# Patient Record
Sex: FEMALE | Race: WHITE | NOT HISPANIC OR LATINO | Employment: FULL TIME | RURAL
[De-identification: names, ages, dates, MRNs, and addresses within clinical notes are randomized per-mention and may not be internally consistent; named-entity substitution may affect disease eponyms.]

---

## 2023-10-26 ENCOUNTER — OFFICE VISIT (OUTPATIENT)
Dept: FAMILY MEDICINE CLINIC | Facility: CLINIC | Age: 53
End: 2023-10-26
Payer: COMMERCIAL

## 2023-10-26 VITALS
TEMPERATURE: 98 F | DIASTOLIC BLOOD PRESSURE: 78 MMHG | HEART RATE: 86 BPM | HEIGHT: 62 IN | SYSTOLIC BLOOD PRESSURE: 116 MMHG | OXYGEN SATURATION: 99 % | RESPIRATION RATE: 18 BRPM | WEIGHT: 143 LBS | BODY MASS INDEX: 26.31 KG/M2

## 2023-10-26 DIAGNOSIS — Z76.89 ENCOUNTER TO ESTABLISH CARE: ICD-10-CM

## 2023-10-26 DIAGNOSIS — K04.7 DENTAL INFECTION: ICD-10-CM

## 2023-10-26 DIAGNOSIS — Z72.0 TOBACCO USE: ICD-10-CM

## 2023-10-26 DIAGNOSIS — L03.90 CELLULITIS, UNSPECIFIED CELLULITIS SITE: ICD-10-CM

## 2023-10-26 DIAGNOSIS — Z00.00 WELLNESS EXAMINATION: ICD-10-CM

## 2023-10-26 DIAGNOSIS — Z11.59 NEED FOR HEPATITIS C SCREENING TEST: ICD-10-CM

## 2023-10-26 DIAGNOSIS — E55.9 VITAMIN D DEFICIENCY: ICD-10-CM

## 2023-10-26 DIAGNOSIS — Z78.0 MENOPAUSE: ICD-10-CM

## 2023-10-26 DIAGNOSIS — J44.9 CHRONIC OBSTRUCTIVE PULMONARY DISEASE, UNSPECIFIED COPD TYPE: ICD-10-CM

## 2023-10-26 DIAGNOSIS — R91.1 LUNG NODULE: ICD-10-CM

## 2023-10-26 NOTE — PATIENT INSTRUCTIONS
"There are several vaccines used for individuals near/over 65 years old.      1. Tetanus.   Like anyone this needs to given every 10 years; sooner for/with lacerations/wounds.   Likely when getting this booster it needs to be a tetanus called Tdap (tetanus mixed with diptheria and pertussis).   Years ago you had this vaccine.  We now know we can lose our immunity to pertussis (a part of this vaccine) and run a risk of catching this.  Now only would this make us ill; but more importantly we can spread this to very young children (and for them it can be a much more dangerous illness).   We call this the grandparent vaccine for this reason.     2. Pneumonia.   This comes now as a one time vaccine for most persons.  Even if you have had these before; we need to review when and your current health situation/s as you may need a booster     3. Shingles.  You do not want to catch shingles.  Though you will recover from this; the pain associated with shingles can be severe.  Even if you have had the now older zostavax, or have had shingles; it is recommended you still get the Shingrix (the new just available early 2018 shingles vaccine).   Medicare began 1.1.23 waving any co-pays for this; it is therefore free.     4. Flu/influenza: Yearly flu vaccine given from September through April each year.  For those over 65 it should be \"high dose\" flu (to complement the possibility the immune system is alittle weaker)    5. RSV: If you are over 60; beginning 10.1.23 you can now take a RSV vaccination.  This would lower your change for catching this winter/\"cold\" virus that can under some circumstances cause significant respiratory symptoms and even require hospitalization.  Being vaccinated also makes it more difficult for you to be contagious and get this to children; particularly children less than 6 months of where this can be a very dangerous illness.      6. COVID: Since the early onset of COVID illness and a the many COVID " vaccines that were initially developed; we will have a winter 2023 (and maybe beyond)  booster particularly for those at higher risk of complications and over age 65.    7. Travel vaccines:  If you are one to do international travel; be sure and ask us for any particular unusual vaccines you may need.     8.  Miscellaneous:  If you have certain health situations/disease you may need specific/particular vaccines not give to the general public.     Your records we have on file:     There is no immunization history on file for this patient.    You therefore could need the ones if we listed below:   Winter 2023 shots: latest/updated covid vaccine + 2023 flu/high dose flu (can/should be given same day)                                    After this; take a break and then work on getting those other vaccines still needed:   shingles      Because of many restrictions on this office always having all the above vaccines; you may be advised to work with your local health department or various pharmacies to keep up with your individual vaccine needs.  If you are forced to get a vaccine outside this office in order to keep your health record up to date; we request you personally notify us after any vaccines of the type and date.

## 2023-10-26 NOTE — PROGRESS NOTES
KIARA”.   Subjective   Erica Rosas is a 53 y.o. female presenting with chief complaint of:   Chief Complaint   Patient presents with    Follow-up     AWV NA  Last Completed Annual Wellness Visit       This patient has no relevant Health Maintenance data.             History of Present Illness :  Alone.  Here for primarily establish care and several issues.       Has multiple chronic problems to consider that might have a bearing on today's issues; an initial appointment.       Chronic/acute problems reviewed today:   1. Lung nodule: told on [facility] CT chest several nodules.  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    2. Cellulitis, unspecified cellulitis site: history/nothing active today   3. Wellness examination: reviewed PH   4. Dental infection several teeth broke off; hx facial cellulits x 2.  Dentist apt today   5. Chronic obstructive pulmonary disease, unspecified COPD type: told she has; daily some cough without hemoptysis, sob, wheeze   6. Tobacco use: smoker/2nd exposure since age 14/birth; still smokes   7. Menopause: chronic/menses has stopped without hot flashes; no recent gyne   8. Encounter to establish care: initial visit; nothing found TCC   9. Vitamin D deficiency: at risk for low Vit D/osteoporosis   10. Need for hepatitis C screening test : unaware if ever tested; has tatoos but no recall being told every had liver problem     Has an/another acute issue today: .    The following portions of the patient's history were reviewed and updated as appropriate: allergies, current medications, past family history, past medical history, past social history, past surgical history, and problem list.    No current outpatient medications on file.none    No problems with medications in general    No Known Allergies    Review of Systems  GENERAL:  Active/slower with limits, speed, stamina for age. Sleep is ok. No fever now./recent  SKIN: No  rash/skin lesion of concern  "unless/other than that above if mentioned  ENDO:  No syncope, near or diaphoretic sweaty spells.  BS Ok past..  HEENT: No recent head injury; or headache.  No vision change.  No hearing loss.  Ears without pain/drainage.  No sore throat.  No  significant nasal/sinus congestion/drainage. No epistaxis.  CHEST: No chest wall tenderness or mass. No significant cough, (though occ) without wheeze.  No SOB; no hemoptysis.  CV: No exertional chest pain, palpitations, ankle edema.  GI: No dysphagia or heartburn.  No abdominal pain, diarrhea, constipation.  No rectal bleeding, or melena.    :  Voids without dysuria;  incontinence to completion.  ORTHO: No painful/swollen joints but various on /off sore.  No  sore neck or back.  No acute neck or back pain without recent injury.  NEURO: No focal/significant weakness of extremities. No dizziness.   No numbness/paresthesias.   PSYCH: No memory loss.  Mood good/occ anxious, depressed but/and not suicidal.  Tries to tolerate stress .   Screening:  Gyne: years ago  Mammogram:   Last Completed Mammogram       This patient has no relevant Health Maintenance data.           Bone density: never  Low dose CT chest: ?  GI: none; advised later  Prostate: NA  Usual lab order to establish    Copy/paste function used for ROS/exam AND each area of these were reviewed, updated, confirmed and supplemented as needed.  Data reviewed:   Recent admit/ER/MD visits: requesting care everwher  Last cardiac testing:   Echo: none found    Radiology considered:   No radiology results for the last 90 days.    Lab Results:  No results found for this or any previous visit.    Objective   /78   Pulse 86   Temp 98 °F (36.7 °C) (Temporal)   Resp 18   Ht 156.2 cm (61.5\")   Wt 64.9 kg (143 lb)   SpO2 99%   BMI 26.58 kg/m²   Body mass index is 26.58 kg/m².    Recent Vitals         10/26/2023             BP: 116/78    Pulse: 86    Temp: 98 °F (36.7 °C)    Weight: 64.9 kg (143 lb)    BMI (Calculated): " 26.6          Wt Readings from Last 15 Encounters:   10/26/23 0818 64.9 kg (143 lb)       Physical Exam  GENERAL: AFA/developed in no acute distress.   SKIN: Turgor ok without wound, rash, lesion of significance  HEENT: Normal cephalic without trauma.  Pupils equal round reactive to light. Extraocular motions full without nystagmus.   External canals nonobstructive nontender without reddness. Tymphatic membranes tia with joshua structures intact.   Oral cavity without growths, exudates, and moist.  Posterior pharynx without mass, obstruction, redness.  No thyromegaly, mass, tenderness, lymphadenopathy and supple.  CV: Regular rhythm.  No murmur, gallop,  edema. Posterior pulses intact.  No carotid bruits.  CHEST: No chest wall tenderness or mass.   LUNGS: Symmetric motion with clear to auscultation.   ABD: Soft, nontender without mass.   ORTHO: Symmetric extremities without swelling/point tenderness.  Full gross range of motion..  NEURO: CN 2-12 grossly intact.  Symmetric facies and UE/LE. 3/5 strength throughout. 1/4 x bicep knee equal reflexes.  Nonfocal use extremities. Speech clear.  Intact light touch with monofilament, vibratory sensation with tuning fork; equal toes/distal feet.    PSYCH: Oriented x 3.  Pleasant calm, well kept.  Purposeful/directed conservation with intact short/long gross memory.     Assessment & Plan     1. Lung nodule    2. Cellulitis, unspecified cellulitis site    3. Wellness examination    4. Dental infection    5. Chronic obstructive pulmonary disease, unspecified COPD type    6. Tobacco use    7. Menopause    8. Encounter to establish care    9. Vitamin D deficiency    10. Need for hepatitis C screening test        Data review above:   Discussions/medical decisions/reviews:  BP ok  Other vitals ok  Recent Vitals         10/26/2023             BP: 116/78    Pulse: 86    Temp: 98 °F (36.7 °C)    Weight: 64.9 kg (143 lb)    BMI (Calculated): 26.6          Initial visit done  Screening  "reviewed/updated to work on  Vaccines discussed (see below)   Several significiant problems; they do need evals  Get records/set up for review; ie care everywhere to review  Labs today; baseline review    Follow up: Return for help her set up care everywhere-emphasis [facility]/Valery MASON; lab today then Dr Reed 1m.  Future Appointments   Date Time Provider Department Center   11/28/2023  8:30 AM Kostas Reed MD [facility]       Data review above:   Rx: reviewed and decisions:   Rx new/changes: none  No orders of the defined types were placed in this encounter.      Orders placed:   LAB/Testing/Referrals: reviewed/orders:   Today:   Orders Placed This Encounter   Procedures    Comprehensive metabolic panel    TSH    Vitamin D,25-Hydroxy    Hepatitis C Antibody    CBC and Differential    Urinalysis With Microscopic - Urine, Clean Catch     Chronic/recurrent labs above or change to:   Same       There is no immunization history on file for this patient.  We advised/reaffirmed our support/suggestion for staying complete with covid- covid boosters, seasonal flu/yearly and any missing vaccine from list we supplied; when cannot be given here we suggest contact with local health department office or pharmacy to review missing/needed vaccines and then bring nursing documentation for these vaccines to this office or call this information in. Shingles became \"free\" 1.1.23 for medicare insurance.    Health maintenance:   Body mass index is 26.58 kg/m².  BMI is >= 25 and <30. (Overweight) The following options were offered after discussion;: exercise counseling/recommendations and nutrition counseling/recommendations      Tobacco use reviewed:   Erica Rosas  reports that she has been smoking cigarettes. She started smoking about 38 years ago. She has a 38.00 pack-year smoking history. She has never used smokeless tobacco.. I have educated her on the risk of diseases from using tobacco products such as cancer, " "COPD, and heart disease.     I advised her to quit and she is not willing to quit.    I spent 3  minutes counseling the patient.      Patient Instructions   There are several vaccines used for individuals near/over 65 years old.      1. Tetanus.   Like anyone this needs to given every 10 years; sooner for/with lacerations/wounds.   Likely when getting this booster it needs to be a tetanus called Tdap (tetanus mixed with diptheria and pertussis).   Years ago you had this vaccine.  We now know we can lose our immunity to pertussis (a part of this vaccine) and run a risk of catching this.  Now only would this make us ill; but more importantly we can spread this to very young children (and for them it can be a much more dangerous illness).   We call this the grandparent vaccine for this reason.     2. Pneumonia.   This comes now as a one time vaccine for most persons.  Even if you have had these before; we need to review when and your current health situation/s as you may need a booster     3. Shingles.  You do not want to catch shingles.  Though you will recover from this; the pain associated with shingles can be severe.  Even if you have had the now older zostavax, or have had shingles; it is recommended you still get the Shingrix (the new just available early 2018 shingles vaccine).   Medicare began 1.1.23 waving any co-pays for this; it is therefore free.     4. Flu/influenza: Yearly flu vaccine given from September through April each year.  For those over 65 it should be \"high dose\" flu (to complement the possibility the immune system is alittle weaker)    5. RSV: If you are over 60; beginning 10.1.23 you can now take a RSV vaccination.  This would lower your change for catching this winter/\"cold\" virus that can under some circumstances cause significant respiratory symptoms and even require hospitalization.  Being vaccinated also makes it more difficult for you to be contagious and get this to children; " particularly children less than 6 months of where this can be a very dangerous illness.      6. COVID: Since the early onset of COVID illness and a the many COVID vaccines that were initially developed; we will have a winter 2023 (and maybe beyond)  booster particularly for those at higher risk of complications and over age 65.    7. Travel vaccines:  If you are one to do international travel; be sure and ask us for any particular unusual vaccines you may need.     8.  Miscellaneous:  If you have certain health situations/disease you may need specific/particular vaccines not give to the general public.     Your records we have on file:     There is no immunization history on file for this patient.    You therefore could need the ones if we listed below:   Winter 2023 shots: latest/updated covid vaccine + 2023 flu/high dose flu (can/should be given same day)                                    After this; take a break and then work on getting those other vaccines still needed:   shingles      Because of many restrictions on this office always having all the above vaccines; you may be advised to work with your local health department or various pharmacies to keep up with your individual vaccine needs.  If you are forced to get a vaccine outside this office in order to keep your health record up to date; we request you personally notify us after any vaccines of the type and date.

## 2023-10-27 ENCOUNTER — PATIENT ROUNDING (BHMG ONLY) (OUTPATIENT)
Dept: FAMILY MEDICINE CLINIC | Facility: CLINIC | Age: 53
End: 2023-10-27
Payer: COMMERCIAL

## 2023-10-27 LAB
25(OH)D3+25(OH)D2 SERPL-MCNC: 12.9 NG/ML (ref 30–100)
ALBUMIN SERPL-MCNC: 4.8 G/DL (ref 3.8–4.9)
ALBUMIN/GLOB SERPL: 2.3 {RATIO} (ref 1.2–2.2)
ALP SERPL-CCNC: 87 IU/L (ref 44–121)
ALT SERPL-CCNC: 12 IU/L (ref 0–32)
APPEARANCE UR: CLEAR
AST SERPL-CCNC: 20 IU/L (ref 0–40)
BACTERIA #/AREA URNS HPF: NORMAL /[HPF]
BASOPHILS # BLD AUTO: 0 X10E3/UL (ref 0–0.2)
BASOPHILS NFR BLD AUTO: 1 %
BILIRUB SERPL-MCNC: 0.3 MG/DL (ref 0–1.2)
BILIRUB UR QL STRIP: NEGATIVE
BUN SERPL-MCNC: 8 MG/DL (ref 6–24)
BUN/CREAT SERPL: 11 (ref 9–23)
CALCIUM SERPL-MCNC: 9.3 MG/DL (ref 8.7–10.2)
CASTS URNS QL MICRO: NORMAL /LPF
CHLORIDE SERPL-SCNC: 100 MMOL/L (ref 96–106)
CO2 SERPL-SCNC: 27 MMOL/L (ref 20–29)
COLOR UR: YELLOW
CREAT SERPL-MCNC: 0.76 MG/DL (ref 0.57–1)
EGFRCR SERPLBLD CKD-EPI 2021: 95 ML/MIN/1.73
EOSINOPHIL # BLD AUTO: 0.2 X10E3/UL (ref 0–0.4)
EOSINOPHIL NFR BLD AUTO: 4 %
EPI CELLS #/AREA URNS HPF: NORMAL /HPF (ref 0–10)
ERYTHROCYTE [DISTWIDTH] IN BLOOD BY AUTOMATED COUNT: 12.2 % (ref 11.7–15.4)
GLOBULIN SER CALC-MCNC: 2.1 G/DL (ref 1.5–4.5)
GLUCOSE SERPL-MCNC: 68 MG/DL (ref 70–99)
GLUCOSE UR QL STRIP: NEGATIVE
HCT VFR BLD AUTO: 42.4 % (ref 34–46.6)
HCV IGG SERPL QL IA: NON REACTIVE
HGB BLD-MCNC: 13.8 G/DL (ref 11.1–15.9)
HGB UR QL STRIP: NEGATIVE
IMM GRANULOCYTES # BLD AUTO: 0 X10E3/UL (ref 0–0.1)
IMM GRANULOCYTES NFR BLD AUTO: 0 %
KETONES UR QL STRIP: NEGATIVE
LEUKOCYTE ESTERASE UR QL STRIP: NEGATIVE
LYMPHOCYTES # BLD AUTO: 2.3 X10E3/UL (ref 0.7–3.1)
LYMPHOCYTES NFR BLD AUTO: 41 %
MCH RBC QN AUTO: 28.7 PG (ref 26.6–33)
MCHC RBC AUTO-ENTMCNC: 32.5 G/DL (ref 31.5–35.7)
MCV RBC AUTO: 88 FL (ref 79–97)
MICRO URNS: NORMAL
MICRO URNS: NORMAL
MONOCYTES # BLD AUTO: 0.5 X10E3/UL (ref 0.1–0.9)
MONOCYTES NFR BLD AUTO: 8 %
NEUTROPHILS # BLD AUTO: 2.6 X10E3/UL (ref 1.4–7)
NEUTROPHILS NFR BLD AUTO: 46 %
NITRITE UR QL STRIP: NEGATIVE
PH UR STRIP: 7 [PH] (ref 5–7.5)
PLATELET # BLD AUTO: 356 X10E3/UL (ref 150–450)
POTASSIUM SERPL-SCNC: 4.4 MMOL/L (ref 3.5–5.2)
PROT SERPL-MCNC: 6.9 G/DL (ref 6–8.5)
PROT UR QL STRIP: NEGATIVE
RBC # BLD AUTO: 4.81 X10E6/UL (ref 3.77–5.28)
RBC #/AREA URNS HPF: NORMAL /HPF (ref 0–2)
SODIUM SERPL-SCNC: 139 MMOL/L (ref 134–144)
SP GR UR STRIP: 1.02 (ref 1–1.03)
TSH SERPL DL<=0.005 MIU/L-ACNC: 0.64 UIU/ML (ref 0.45–4.5)
UROBILINOGEN UR STRIP-MCNC: 0.2 MG/DL (ref 0.2–1)
WBC # BLD AUTO: 5.7 X10E3/UL (ref 3.4–10.8)
WBC #/AREA URNS HPF: NORMAL /HPF (ref 0–5)

## 2023-10-27 RX ORDER — ERGOCALCIFEROL 1.25 MG/1
50000 CAPSULE ORAL WEEKLY
Qty: 6 CAPSULE | Refills: 0 | Status: SHIPPED | OUTPATIENT
Start: 2023-10-27

## 2023-10-27 NOTE — PROGRESS NOTES
Reviewed results - BIOSAFEt message sent.  If not seen in 3 days (3 day alert set), will send to pool to call the message.      Electronically signed by JORDAN Malloy, 10/27/23, 8:11 AM CDT.

## 2023-10-27 NOTE — PROGRESS NOTES
October 27, 2023    Hello, may I speak with Erica Rosas?    My name is haydee      I am  with [facility]  [address] [facility] [id number] [phone].    Before we get started may I verify your date of birth? 1970    I am calling to officially welcome you to our practice and ask about your recent visit. Is this a good time to talk? yes    Tell me about your visit with us. What things went well?  very good       We're always looking for ways to make our patients' experiences even better. Do you have recommendations on ways we may improve?  no    Overall were you satisfied with your first visit to our practice? yes       I appreciate you taking the time to speak with me today. Is there anything else I can do for you? no      Thank you, and have a great day.

## 2023-11-28 ENCOUNTER — OFFICE VISIT (OUTPATIENT)
Dept: FAMILY MEDICINE CLINIC | Facility: CLINIC | Age: 53
End: 2023-11-28
Payer: COMMERCIAL

## 2023-11-28 VITALS
DIASTOLIC BLOOD PRESSURE: 72 MMHG | BODY MASS INDEX: 26.31 KG/M2 | SYSTOLIC BLOOD PRESSURE: 124 MMHG | HEIGHT: 62 IN | OXYGEN SATURATION: 99 % | WEIGHT: 143 LBS | RESPIRATION RATE: 18 BRPM | TEMPERATURE: 96.9 F | HEART RATE: 66 BPM

## 2023-11-28 DIAGNOSIS — E55.9 VITAMIN D DEFICIENCY: ICD-10-CM

## 2023-11-28 DIAGNOSIS — J44.9 CHRONIC OBSTRUCTIVE PULMONARY DISEASE, UNSPECIFIED COPD TYPE: ICD-10-CM

## 2023-11-28 DIAGNOSIS — Z13.9 SCREENING DUE: ICD-10-CM

## 2023-11-28 DIAGNOSIS — R91.1 LUNG NODULE: ICD-10-CM

## 2023-11-28 DIAGNOSIS — Z78.0 MENOPAUSE: ICD-10-CM

## 2023-11-28 DIAGNOSIS — Z12.31 ENCOUNTER FOR SCREENING MAMMOGRAM FOR BREAST CANCER: ICD-10-CM

## 2023-11-28 DIAGNOSIS — Z72.0 TOBACCO USE: ICD-10-CM

## 2023-11-28 DIAGNOSIS — Z12.11 ENCOUNTER FOR SCREENING FOR MALIGNANT NEOPLASM OF COLON: ICD-10-CM

## 2023-11-28 RX ORDER — ERGOCALCIFEROL 1.25 MG/1
50000 CAPSULE ORAL WEEKLY
Qty: 6 CAPSULE | Refills: 0 | Status: SHIPPED | OUTPATIENT
Start: 2023-11-28

## 2023-11-28 RX ORDER — FAMOTIDINE 20 MG
1000 TABLET ORAL 2 TIMES DAILY
Qty: 60 CAPSULE | Refills: 5 | Status: SHIPPED | OUTPATIENT
Start: 2023-11-28

## 2023-11-28 NOTE — PROGRESS NOTES
KIARA”.   Subjective   Erica Rosas is a 53 y.o. female presenting with chief complaint of:   Chief Complaint   Patient presents with    Follow-up     AWV NA  Last Completed Annual Wellness Visit       This patient has no relevant Health Maintenance data.             History of Present Illness :  Alone.  Here for primarily f/u last visit; initial encounter visit.   Here for review of chronic problems that includes vit D def and others    Has few chronic problems to consider that might have a bearing on today's issues;  an interval appointment.       Chronic/acute problems reviewed today:   1. Encounter for screening for malignant neoplasm of colon: has never had colonoscopy; denies change stool, bleeding.     2. Vitamin D deficiency: see labs   3. Menopause: chronic/stable; tolerated with low Vit D   4. Lung nodule: told at [facility] ER on CXR; needs some kind of f/u.  We requested records last visit and have not received; care everwhere nothing.  No hemoptysis; long term smoker.    5. Chronic obstructive pulmonary disease, unspecified COPD type: Chronic/stable no significant cough, sob, wheeze.  Rx declined or testing.   Still smoking.       6. Tobacco use: Chronic/stable. Has been advised before/here to stop smoking and giving advice of available resources to help with that.  Has never really stopped before.      7. Screening due: due several screening; has had limitations on abilities to do these prior to current insurance.     8. Encounter for screening mammogram for breast cancer: Chronic/ongoing yearly need to review for breast cancer.  No breast pain, masses, discharge.         Has an/another acute issue today: none.    The following portions of the patient's history were reviewed and updated as appropriate: allergies, current medications, past family history, past medical history, past social history, past surgical history, and problem list.    Current Outpatient Medications:     vitamin D  "(ERGOCALCIFEROL) 1.25 MG (19418 UT) capsule capsule, TAKE 1 CAPSULE BY MOUTH 1 (ONE) TIME PER WEEK., Disp: 6 capsule, Rfl: 0    No problems with medications.    No Known Allergies    Review of Systems  GENERAL:  Active/slower with limits, speed, stamina for age. Sleep is ok. No fever now./recent  SKIN: No  rash/skin lesion of concern unless/other than that above if mentioned  ENDO:  No syncope, near or diaphoretic sweaty spells.  BS Ok past..  HEENT: No recent head injury; or headache.  No vision change.  No hearing loss.  Ears without pain/drainage.  No sore throat.  No significant nasal/sinus congestion/drainage. No epistaxis.  CHEST: No chest wall tenderness or mass. No significant cough, (though occ) without wheeze.  No SOB; no hemoptysis.  CV: No exertional chest pain, palpitations, ankle edema.  GI: No dysphagia or heartburn.  No abdominal pain, diarrhea, constipation.  No rectal bleeding, or melena.    :  Voids without dysuria; no incontinence to completion.  ORTHO: No painful/swollen joints but various on /off sore.  No  sore neck or back.  No acute neck or back pain without recent injury.  NEURO: No focal/significant weakness of extremities. No dizziness.   No numbness/paresthesias.   PSYCH: No memory loss.  Mood good/occ anxious, depressed but/and not suicidal.  Tries to tolerate stress .   Screening:  Gyne: years ago  Mammogram: none  Bone density: never  Low dose CT chest: Tobacco-smoker/age 15/1 ppd  Ordering 12.2023 Ct lung due to CXR [facility] \"nodule\"  GI: none; advised later  Prostate: NA  Usual lab order to establish     Copy/paste function used for ROS/exam AND each area of these were reviewed, updated, confirmed and supplemented as needed.  Data reviewed:   Recent admit/ER/MD visits: 10.26.23    1. Lung nodule    2. Cellulitis, unspecified cellulitis site    3. Wellness examination    4. Dental infection    5. Chronic obstructive pulmonary disease, unspecified COPD type    6. Tobacco use "    7. Menopause    8. Encounter to establish care    9. Vitamin D deficiency    10. Need for hepatitis C screening test          Data review above:   Discussions/medical decisions/reviews:  BP ok  Other vitals ok  Recent Vitals           10/26/2023                    BP: 116/78     Pulse: 86     Temp: 98 °F (36.7 °C)     Weight: 64.9 kg (143 lb)     BMI (Calculated): 26.6             Initial visit done  Screening reviewed/updated to work on  Vaccines discussed (see below)   Several significiant problems; they do need evals  Get records/set up for review; ie care everywhere to review  Labs today; baseline review     Follow up: Return for help her set up care everywhere-emphasis [facility]; lab today then Dr Reed 1m.         Future Appointments   Date Time Provider Department Center   11/28/2023  8:30 AM Kostas Reed MD [facility]         Data review above:   Rx: reviewed and decisions:   Rx new/changes: none  No orders of the defined types were placed in this encounter.        Orders placed:   LAB/Testing/Referrals: reviewed/orders:   Today:       Orders Placed This Encounter   Procedures    Comprehensive metabolic panel    TSH    Vitamin D,25-Hydroxy    Hepatitis C Antibody    CBC and Differential    Urinalysis With Microscopic - Urine, Clean Catch       Last cardiac testing:   Echo:     Radiology considered:   No radiology results for the last 90 days.    Lab Results:  Results for orders placed or performed in visit on 10/26/23   Comprehensive metabolic panel    Specimen: Blood   Result Value Ref Range    Glucose 68 (L) 70 - 99 mg/dL    BUN 8 6 - 24 mg/dL    Creatinine 0.76 0.57 - 1.00 mg/dL    EGFR Result 95 >59 mL/min/1.73    BUN/Creatinine Ratio 11 9 - 23    Sodium 139 134 - 144 mmol/L    Potassium 4.4 3.5 - 5.2 mmol/L    Chloride 100 96 - 106 mmol/L    Total CO2 27 20 - 29 mmol/L    Calcium 9.3 8.7 - 10.2 mg/dL    Total Protein 6.9 6.0 - 8.5 g/dL    Albumin 4.8 3.8 - 4.9 g/dL     Globulin 2.1 1.5 - 4.5 g/dL    A/G Ratio 2.3 (H) 1.2 - 2.2    Total Bilirubin 0.3 0.0 - 1.2 mg/dL    Alkaline Phosphatase 87 44 - 121 IU/L    AST (SGOT) 20 0 - 40 IU/L    ALT (SGPT) 12 0 - 32 IU/L   TSH    Specimen: Blood   Result Value Ref Range    TSH 0.642 0.450 - 4.500 uIU/mL   Vitamin D,25-Hydroxy    Specimen: Blood   Result Value Ref Range    25 Hydroxy, Vitamin D 12.9 (L) 30.0 - 100.0 ng/mL   Microscopic Examination -   Result Value Ref Range    WBC, UA None seen 0 - 5 /hpf    RBC, UA 0-2 0 - 2 /hpf    Epithelial Cells (non renal) 0-10 0 - 10 /hpf    Casts None seen None seen /lpf    Bacteria, UA None seen None seen/Few   Hepatitis C Antibody   Result Value Ref Range    Hep C Virus Ab Non Reactive Non Reactive   CBC and Differential    Specimen: Blood   Result Value Ref Range    WBC 5.7 3.4 - 10.8 x10E3/uL    RBC 4.81 3.77 - 5.28 x10E6/uL    Hemoglobin 13.8 11.1 - 15.9 g/dL    Hematocrit 42.4 34.0 - 46.6 %    MCV 88 79 - 97 fL    MCH 28.7 26.6 - 33.0 pg    MCHC 32.5 31.5 - 35.7 g/dL    RDW 12.2 11.7 - 15.4 %    Platelets 356 150 - 450 x10E3/uL    Neutrophil Rel % 46 Not Estab. %    Lymphocyte Rel % 41 Not Estab. %    Monocyte Rel % 8 Not Estab. %    Eosinophil Rel % 4 Not Estab. %    Basophil Rel % 1 Not Estab. %    Neutrophils Absolute 2.6 1.4 - 7.0 x10E3/uL    Lymphocytes Absolute 2.3 0.7 - 3.1 x10E3/uL    Monocytes Absolute 0.5 0.1 - 0.9 x10E3/uL    Eosinophils Absolute 0.2 0.0 - 0.4 x10E3/uL    Basophils Absolute 0.0 0.0 - 0.2 x10E3/uL    Immature Granulocyte Rel % 0 Not Estab. %    Immature Grans Absolute 0.0 0.0 - 0.1 x10E3/uL   Urinalysis With Microscopic - Urine, Clean Catch    Specimen: Urine, Clean Catch   Result Value Ref Range    Specific Gravity, UA 1.018 1.005 - 1.030    pH, UA 7.0 5.0 - 7.5    Color, UA Yellow Yellow    Appearance, UA Clear Clear    Leukocytes, UA Negative Negative    Protein Negative Negative/Trace    Glucose, UA Negative Negative    Ketones Negative Negative    Blood, UA  "Negative Negative    Bilirubin, UA Negative Negative    Urobilinogen, UA 0.2 0.2 - 1.0 mg/dL    Nitrite, UA Negative Negative    Microscopic Examination Comment     Microscopic Examination See below:        A1C:No results for input(s): \"HGBA1C\" in the last 77875 hours.  GLUCOSE:  Lab Results - Last 18 Months   Lab Units 10/26/23  0758   GLUCOSE mg/dL 68*     LIPID:No results for input(s): \"CHLPL\", \"LDL\", \"HDL\", \"TRIG\" in the last 18985 hours.  PSA:No results found for: \"PSA\"      CBC:  Lab Results - Last 18 Months   Lab Units 10/26/23  0758   WBC x10E3/uL 5.7   HEMOGLOBIN g/dL 13.8   HEMATOCRIT % 42.4   PLATELETS x10E3/uL 356     BMP/CMP:  Lab Results - Last 18 Months   Lab Units 10/26/23  0758   SODIUM mmol/L 139   POTASSIUM mmol/L 4.4   CHLORIDE mmol/L 100   CO2 mmol/L 27   GLUCOSE mg/dL 68*   BUN mg/dL 8   CREATININE mg/dL 0.76   EGFR RESULT mL/min/1.73 95   CALCIUM mg/dL 9.3       HEPATIC:  Lab Results - Last 18 Months   Lab Units 10/26/23  0758   ALT (SGPT) IU/L 12   AST (SGOT) IU/L 20   ALK PHOS IU/L 87     HEPATITIS C ANTIBODY:   Lab Results   Component Value Date/Time    HEPCVIRUSABY Non Reactive 10/26/2023 07:58 AM     Vit D:  Lab Results - Last 18 Months   Lab Units 10/26/23  0758   VIT D 25 HYDROXY ng/mL 12.9*     THYROID:  Lab Results - Last 18 Months   Lab Units 10/26/23  0758   TSH uIU/mL 0.642       Objective   /72   Pulse 66   Temp 96.9 °F (36.1 °C) (Temporal)   Resp 18   Ht 156.2 cm (61.5\")   Wt 64.9 kg (143 lb)   SpO2 99%   BMI 26.58 kg/m²   Body mass index is 26.58 kg/m².    Recent Vitals         10/26/2023             BP: 116/78    Pulse: 86    Temp: 98 °F (36.7 °C)    Weight: 64.9 kg (143 lb)    BMI (Calculated): 26.6          Wt Readings from Last 15 Encounters:   11/28/23 0846 64.9 kg (143 lb)   10/26/23 0818 64.9 kg (143 lb)       Physical Exam  GENERAL: AFA/developed in no acute distress.   SKIN: Turgor ok without wound, rash, lesion of significance  HEENT: Normal cephalic " without trauma.  Pupils equal round reactive to light. Extraocular motions full without nystagmus.   External canals nonobstructive nontender without reddness. Tymphatic membranes tia with joshua structures intact.   Oral cavity without growths, exudates, and moist.  Posterior pharynx without mass, obstruction, redness.  No thyromegaly, mass, tenderness, lymphadenopathy and supple.  CV: Regular rhythm.  No murmur, gallop,  edema. Posterior pulses intact.  No carotid bruits.  CHEST: No chest wall tenderness or mass.   LUNGS: Symmetric motion with clear to auscultation.   ABD: Soft, nontender without mass.   ORTHO: Symmetric extremities without swelling/point tenderness.  Full gross range of motion..  NEURO: CN 2-12 grossly intact.  Symmetric facies and UE/LE. 3/5 strength throughout. 1/4 x bicep knee equal reflexes.  Nonfocal use extremities. Speech clear.  Intact light touch with monofilament, vibratory sensation with tuning fork; equal toes/distal feet.    PSYCH: Oriented x 3.  Pleasant calm, well kept.  Purposeful/directed conservation with intact short/long gross memory.     Assessment & Plan     1. Encounter for screening for malignant neoplasm of colon    2. Vitamin D deficiency    3. Menopause    4. Lung nodule    5. Chronic obstructive pulmonary disease, unspecified COPD type    6. Tobacco use    7. Screening due    8. Encounter for screening mammogram for breast cancer        Data review above:   Discussions/medical decisions/reviews:  BP ok  Other vitals ok  Recent Vitals         10/26/2023             BP: 116/78    Pulse: 86    Temp: 98 °F (36.7 °C)    Weight: 64.9 kg (143 lb)    BMI (Calculated): 26.6          DM/BS 68 10.26.23  Lipid LDL sometime  PSA NA  CBC ok 10.26.23  Renal ok 10.26.23  Liver ok 10.26.23  Vit D 12.9 10.26.23  Thyroid TSH ok 10.26.23    Screening reviewed/updated attempted to work on (mammogram, gi/colon)  Vaccines discussed (see below) winter flu, covid, declined  Needs CT lung at  "some point; freda chose 3m from she being told but if she wants to go to [facility] and get records and bring here; will review  [facility] for getting oral surgery/dental work done discussed.  She was told only site [place].   Reviewed with her [facility] [address] as maybe method to get there and get care.     Follow up: Return for lab/Dr Reed 6m.  Future Appointments   Date Time Provider Department Center   1/17/2024  8:10 AM [facility]   5/28/2024  9:45 AM Kostas Reed MD [facility]       Data review above:   Rx: reviewed and decisions:   Rx new/changes: new  New Medications Ordered This Visit   Medications    Vitamin D, Cholecalciferol, 25 MCG (1000 UT) capsule     Sig: Take 1 capsule by mouth 2 (Two) Times a Day.     Dispense:  60 capsule     Refill:  5       Orders placed:   LAB/Testing/Referrals: reviewed/orders:   Today:   Orders Placed This Encounter   Procedures    CT Chest With & Without Contrast    Mammo Screening Digital Tomosynthesis Bilateral With CAD    Ambulatory Referral to Gastroenterology     Chronic/recurrent labs above or change to:   Same       There is no immunization history on file for this patient.  We advised/reaffirmed our support/suggestion for staying complete with covid- covid boosters, seasonal flu/yearly and any missing vaccine from list we supplied; when cannot be given here we suggest contact with local health department office or pharmacy to review missing/needed vaccines and then bring nursing documentation for these vaccines to this office or call this information in. Shingles became \"free\" 1.1.23 for medicare insurance.    Health maintenance:   Body mass index is 26.58 kg/m².         Tobacco use reviewed:   Erica Rosas  reports that she has been smoking cigarettes. She started smoking about 38 years ago. She has a 38.00 pack-year smoking history. She has never used smokeless tobacco.. I have educated her on the risk " of diseases from using tobacco products such as cancer, COPD, and heart disease.     I advised her to quit and she is not willing to quit.    I spent 3  minutes counseling the patient.       Reminded; as discussed before.      There are no Patient Instructions on file for this visit.

## 2023-12-05 DIAGNOSIS — Z12.31 ENCOUNTER FOR SCREENING MAMMOGRAM FOR BREAST CANCER: ICD-10-CM

## 2023-12-05 DIAGNOSIS — R92.8 ABNORMAL MAMMOGRAM: Primary | ICD-10-CM

## 2024-01-17 ENCOUNTER — LAB (OUTPATIENT)
Dept: FAMILY MEDICINE CLINIC | Facility: CLINIC | Age: 54
End: 2024-01-17
Payer: COMMERCIAL

## 2024-01-17 DIAGNOSIS — E55.9 VITAMIN D DEFICIENCY: Primary | ICD-10-CM

## 2024-01-17 DIAGNOSIS — Z00.00 WELLNESS EXAMINATION: ICD-10-CM

## 2024-01-18 LAB
25(OH)D3+25(OH)D2 SERPL-MCNC: 41.5 NG/ML (ref 30–100)
ALBUMIN SERPL-MCNC: 4.6 G/DL (ref 3.5–5.2)
ALBUMIN/GLOB SERPL: 2.1 G/DL
ALP SERPL-CCNC: 86 U/L (ref 39–117)
ALT SERPL-CCNC: 14 U/L (ref 1–33)
AST SERPL-CCNC: 17 U/L (ref 1–32)
BASOPHILS # BLD AUTO: 0.04 10*3/MM3 (ref 0–0.2)
BASOPHILS NFR BLD AUTO: 0.6 % (ref 0–1.5)
BILIRUB SERPL-MCNC: 0.3 MG/DL (ref 0–1.2)
BUN SERPL-MCNC: 15 MG/DL (ref 6–20)
BUN/CREAT SERPL: 17.9 (ref 7–25)
CALCIUM SERPL-MCNC: 9.8 MG/DL (ref 8.6–10.5)
CHLORIDE SERPL-SCNC: 103 MMOL/L (ref 98–107)
CO2 SERPL-SCNC: 24.9 MMOL/L (ref 22–29)
CREAT SERPL-MCNC: 0.84 MG/DL (ref 0.57–1)
EGFRCR SERPLBLD CKD-EPI 2021: 84.3 ML/MIN/1.73
EOSINOPHIL # BLD AUTO: 0.12 10*3/MM3 (ref 0–0.4)
EOSINOPHIL NFR BLD AUTO: 1.9 % (ref 0.3–6.2)
ERYTHROCYTE [DISTWIDTH] IN BLOOD BY AUTOMATED COUNT: 13.3 % (ref 12.3–15.4)
GLOBULIN SER CALC-MCNC: 2.2 GM/DL
GLUCOSE SERPL-MCNC: 87 MG/DL (ref 65–99)
HCT VFR BLD AUTO: 40.1 % (ref 34–46.6)
HCV IGG SERPL QL IA: NON REACTIVE
HGB BLD-MCNC: 12.9 G/DL (ref 12–15.9)
IMM GRANULOCYTES # BLD AUTO: 0.01 10*3/MM3 (ref 0–0.05)
IMM GRANULOCYTES NFR BLD AUTO: 0.2 % (ref 0–0.5)
LYMPHOCYTES # BLD AUTO: 2.04 10*3/MM3 (ref 0.7–3.1)
LYMPHOCYTES NFR BLD AUTO: 31.8 % (ref 19.6–45.3)
MCH RBC QN AUTO: 28.2 PG (ref 26.6–33)
MCHC RBC AUTO-ENTMCNC: 32.2 G/DL (ref 31.5–35.7)
MCV RBC AUTO: 87.6 FL (ref 79–97)
MONOCYTES # BLD AUTO: 0.44 10*3/MM3 (ref 0.1–0.9)
MONOCYTES NFR BLD AUTO: 6.9 % (ref 5–12)
NEUTROPHILS # BLD AUTO: 3.77 10*3/MM3 (ref 1.7–7)
NEUTROPHILS NFR BLD AUTO: 58.6 % (ref 42.7–76)
NRBC BLD AUTO-RTO: 0 /100 WBC (ref 0–0.2)
PLATELET # BLD AUTO: 299 10*3/MM3 (ref 140–450)
POTASSIUM SERPL-SCNC: 4.5 MMOL/L (ref 3.5–5.2)
PROT SERPL-MCNC: 6.8 G/DL (ref 6–8.5)
RBC # BLD AUTO: 4.58 10*6/MM3 (ref 3.77–5.28)
SODIUM SERPL-SCNC: 142 MMOL/L (ref 136–145)
TSH SERPL DL<=0.005 MIU/L-ACNC: 1 UIU/ML (ref 0.27–4.2)
WBC # BLD AUTO: 6.42 10*3/MM3 (ref 3.4–10.8)

## 2024-01-18 NOTE — PROGRESS NOTES
Reviewed results - [employer] message sent.  If not seen in 3 days (3 day alert set), will send to pool to call the message.      Electronically signed by JORDAN Malloy, 01/18/24, 8:55 AM CST.

## 2024-03-28 ENCOUNTER — OFFICE VISIT (OUTPATIENT)
Dept: FAMILY MEDICINE CLINIC | Facility: CLINIC | Age: 54
End: 2024-03-28
Payer: COMMERCIAL

## 2024-03-28 VITALS
SYSTOLIC BLOOD PRESSURE: 138 MMHG | TEMPERATURE: 98.6 F | OXYGEN SATURATION: 98 % | RESPIRATION RATE: 18 BRPM | DIASTOLIC BLOOD PRESSURE: 62 MMHG | HEIGHT: 61 IN | WEIGHT: 150 LBS | BODY MASS INDEX: 28.32 KG/M2 | HEART RATE: 68 BPM

## 2024-03-28 DIAGNOSIS — R91.1 LUNG NODULE: ICD-10-CM

## 2024-03-28 DIAGNOSIS — R07.89 ATYPICAL CHEST PAIN: ICD-10-CM

## 2024-03-28 DIAGNOSIS — R12 HEARTBURN: ICD-10-CM

## 2024-03-28 DIAGNOSIS — R07.9 CHEST PAIN, UNSPECIFIED TYPE: ICD-10-CM

## 2024-03-28 RX ORDER — PANTOPRAZOLE SODIUM 40 MG/1
40 TABLET, DELAYED RELEASE ORAL DAILY
Qty: 30 TABLET | Refills: 5 | Status: SHIPPED | OUTPATIENT
Start: 2024-03-28

## 2024-03-28 RX ORDER — PANTOPRAZOLE SODIUM 40 MG/1
40 TABLET, DELAYED RELEASE ORAL DAILY
COMMUNITY
Start: 2024-03-14 | End: 2024-03-28 | Stop reason: SDUPTHER

## 2024-03-28 NOTE — PROGRESS NOTES
KIARA”.   Subjective   Erica Rosas is a 53 y.o. female presenting with chief complaint of:   Chief Complaint   Patient presents with    ER FOLLOW UP     Was in er at [facility] on March 14th- thought it was a heart attack-tightness in left side of chest SOA, right behind strum.     AWV NA  Last Completed Annual Wellness Visit       This patient has no relevant Health Maintenance data.             History of Present Illness :  Alone.   FU above.     Has multiple chronic problems to consider that might have a bearing on today's issues; not an interval appointment.       Chronic/acute problems reviewed today:   1. Chest pain, unspecified type see atypical    2. Atypical chest pain she was sitting in fact in a car; started having a chest pressure that was unassociated with nausea vomiting diaphoresis.  Went to [facility] ER where no acute findings were found; no stress testing was done.  She is now using a PPI and is feeling better.   3. Lung nodule chronic on previous imaging and still has not got the repeat imaging for this.  Denies hemoptysis   4. Heartburn: Chronic/stable.  Some heartburn, reflux without dysphagia, melena.  Rx used helping, periods not used proven currently needed with symptoms -currently doing ok.        Has an/another acute issue today: none.    The following portions of the patient's history were reviewed and updated as appropriate: allergies, current medications, past family history, past medical history, past social history, past surgical history, and problem list.      Current Outpatient Medications:     pantoprazole (PROTONIX) 40 MG EC tablet, Take 1 tablet by mouth Daily., Disp: , Rfl:     Vitamin D, Cholecalciferol, 25 MCG (1000 UT) capsule, Take 1 capsule by mouth 2 (Two) Times a Day., Disp: 60 capsule, Rfl: 5    No problems with medications.    No Known Allergies    Review of Systems  GENERAL:  Active/slower with limits, speed, stamina for age. Sleep is ok. No fever now./recent  SKIN: No   "rash/skin lesion of concern unless/other than that above if mentioned  ENDO:  No syncope, near or diaphoretic sweaty spells.  BS Ok past..  HEENT: No recent head injury; or headache.  No vision change.  No hearing loss.  Ears without pain/drainage.  No sore throat.  No significant nasal/sinus congestion/drainage. No epistaxis.  CHEST: No exertional chest wall tenderness or mass. No significant cough, (though occ) without wheeze.  No SOB; no hemoptysis.  CV: No exertional chest pain, palpitations, ankle edema.  GI: No dysphagia; recent heartburn.  No abdominal pain, diarrhea, constipation.  No rectal bleeding, or melena.    :  Voids without dysuria; no incontinence to completion.  ORTHO: No painful/swollen joints but various on /off sore.  No  sore neck or back.  No acute neck or back pain without recent injury.  NEURO: No focal/significant weakness of extremities. No dizziness.   No numbness/paresthesias.   PSYCH: No memory loss.  Mood good/occ anxious, depressed but/and not suicidal.  Tries to tolerate stress .   Screening:  Gyne: years ago  Mammogram: 12.11.23  Bone density: never  Low dose CT chest: Tobacco-smoker/age 15/1 ppd  Ordering 12.2023 Ct lung due to CXR heartlucy/Valery \"nodule\"  GI: none; advised later  Prostate: NA  Usual lab order to establish       Copy/paste function used for ROS/exam AND each area of these were reviewed, updated, confirmed and supplemented as needed.  Data reviewed:   Recent admit/ER/MD visits: 11.28.23    1. Encounter for screening for malignant neoplasm of colon    2. Vitamin D deficiency    3. Menopause    4. Lung nodule    5. Chronic obstructive pulmonary disease, unspecified COPD type    6. Tobacco use    7. Screening due    8. Encounter for screening mammogram for breast cancer          Data review above:   Discussions/medical decisions/reviews:  BP ok  Other vitals ok  Recent Vitals           10/26/2023                    BP: 116/78     Pulse: 86     Temp: 98 °F (36.7 °C) "     Weight: 64.9 kg (143 lb)     BMI (Calculated): 26.6             DM/BS 68 10.26.23  Lipid LDL sometime  PSA NA  CBC ok 10.26.23  Renal ok 10.26.23  Liver ok 10.26.23  Vit D 12.9 10.26.23  Thyroid TSH ok 10.26.23     Screening reviewed/updated attempted to work on (mammogram, gi/colon)  Vaccines discussed (see below) winter flu, covid, declined  Needs CT lung at some point; emperically chose 3m from she being told but if she wants to go to [facility] and get records and bring here; will review  [facility] for getting oral surgery/dental work done discussed.  She was told only site [place].   Reviewed with her [facility] [facility] as maybe method to get there and get care.     Last cardiac testing:   Echo:     .  Radiology considered:   No radiology results for the last 90 days.    [facility]/imaging/ER/3.14.24/3.14.24  CXR for chest pain; neg  Per ED    [facility]/lab/ED/3.14.24-same  CBC neg  Per ED    [facility]/lab/ED/3.14.24/same  CMP Cl 108, C02 21  Troponin neg  Per ED    Lab Results:  Results for orders placed or performed in visit on 01/17/24   Comprehensive Metabolic Panel    Specimen: Blood   Result Value Ref Range    Glucose 87 65 - 99 mg/dL    BUN 15 6 - 20 mg/dL    Creatinine 0.84 0.57 - 1.00 mg/dL    EGFR Result 84.3 >60.0 mL/min/1.73    BUN/Creatinine Ratio 17.9 7.0 - 25.0    Sodium 142 136 - 145 mmol/L    Potassium 4.5 3.5 - 5.2 mmol/L    Chloride 103 98 - 107 mmol/L    Total CO2 24.9 22.0 - 29.0 mmol/L    Calcium 9.8 8.6 - 10.5 mg/dL    Total Protein 6.8 6.0 - 8.5 g/dL    Albumin 4.6 3.5 - 5.2 g/dL    Globulin 2.2 gm/dL    A/G Ratio 2.1 g/dL    Total Bilirubin 0.3 0.0 - 1.2 mg/dL    Alkaline Phosphatase 86 39 - 117 U/L    AST (SGOT) 17 1 - 32 U/L    ALT (SGPT) 14 1 - 33 U/L   TSH    Specimen: Blood   Result Value Ref Range    TSH 1.000 0.270 - 4.200 uIU/mL   Vitamin D,25-Hydroxy    Specimen: Blood   Result Value Ref Range    25 Hydroxy, Vitamin D 41.5 30.0 - 100.0 ng/ml   Hepatitis C Antibody    Specimen:  "Blood   Result Value Ref Range    Hep C Virus Ab Non Reactive Non Reactive   CBC & Differential    Specimen: Blood   Result Value Ref Range    WBC 6.42 3.40 - 10.80 10*3/mm3    RBC 4.58 3.77 - 5.28 10*6/mm3    Hemoglobin 12.9 12.0 - 15.9 g/dL    Hematocrit 40.1 34.0 - 46.6 %    MCV 87.6 79.0 - 97.0 fL    MCH 28.2 26.6 - 33.0 pg    MCHC 32.2 31.5 - 35.7 g/dL    RDW 13.3 12.3 - 15.4 %    Platelets 299 140 - 450 10*3/mm3    Neutrophil Rel % 58.6 42.7 - 76.0 %    Lymphocyte Rel % 31.8 19.6 - 45.3 %    Monocyte Rel % 6.9 5.0 - 12.0 %    Eosinophil Rel % 1.9 0.3 - 6.2 %    Basophil Rel % 0.6 0.0 - 1.5 %    Neutrophils Absolute 3.77 1.70 - 7.00 10*3/mm3    Lymphocytes Absolute 2.04 0.70 - 3.10 10*3/mm3    Monocytes Absolute 0.44 0.10 - 0.90 10*3/mm3    Eosinophils Absolute 0.12 0.00 - 0.40 10*3/mm3    Basophils Absolute 0.04 0.00 - 0.20 10*3/mm3    Immature Granulocyte Rel % 0.2 0.0 - 0.5 %    Immature Grans Absolute 0.01 0.00 - 0.05 10*3/mm3    nRBC 0.0 0.0 - 0.2 /100 WBC       A1C:No results for input(s): \"HGBA1C\" in the last 47850 hours.  GLUCOSE:  Lab Results - Last 18 Months   Lab Units 01/17/24  0740 10/26/23  0758   GLUCOSE mg/dL 87 68*     LIPID:No results for input(s): \"CHLPL\", \"LDL\", \"HDL\", \"TRIG\" in the last 86521 hours.  PSA:No results found for: \"PSA\"      CBC:  Lab Results - Last 18 Months   Lab Units 01/17/24  0740 10/26/23  0758   WBC 10*3/mm3 6.42 5.7   HEMOGLOBIN g/dL 12.9 13.8   HEMATOCRIT % 40.1 42.4   PLATELETS 10*3/mm3 299 356     BMP/CMP:  Lab Results - Last 18 Months   Lab Units 01/17/24  0740 10/26/23  0758   SODIUM mmol/L 142 139   POTASSIUM mmol/L 4.5 4.4   CHLORIDE mmol/L 103 100   CO2 mmol/L 24.9 27   GLUCOSE mg/dL 87 68*   BUN mg/dL 15 8   CREATININE mg/dL 0.84 0.76   EGFR RESULT mL/min/1.73 84.3 95   CALCIUM mg/dL 9.8 9.3       HEPATIC:  Lab Results - Last 18 Months   Lab Units 01/17/24  0740 10/26/23  0758   ALT (SGPT) U/L 14 12   AST (SGOT) U/L 17 20   ALK PHOS U/L 86 87     HEPATITIS C " "ANTIBODY:   Lab Results   Component Value Date/Time    HEPCVIRUSABY Non Reactive 01/17/2024 07:40 AM    HEPCVIRUSABY Non Reactive 10/26/2023 07:58 AM     Vit D:  Lab Results - Last 18 Months   Lab Units 01/17/24  0740 10/26/23  0758   VIT D 25 HYDROXY ng/ml 41.5 12.9*     THYROID:  Lab Results - Last 18 Months   Lab Units 01/17/24  0740 10/26/23  0758   TSH uIU/mL 1.000 0.642       Objective   /62 (BP Location: Right arm, Patient Position: Sitting, Cuff Size: Adult)   Pulse 68   Temp 98.6 °F (37 °C)   Resp 18   Ht 156.2 cm (61.5\")   Wt 68 kg (150 lb)   SpO2 98%   BMI 27.89 kg/m²   Body mass index is 27.89 kg/m².    Recent Vitals         10/26/2023 11/28/2023 3/28/2024       BP: 116/78 124/72 138/62     Pulse: 86 66 68     Temp: 98 °F (36.7 °C) 96.9 °F (36.1 °C) 98.6 °F (37 °C)     Weight: 64.9 kg (143 lb) 64.9 kg (143 lb) 68 kg (150 lb)     BMI (Calculated): 26.6 26.6 27.9           Wt Readings from Last 15 Encounters:   03/28/24 1113 68 kg (150 lb)   11/28/23 0846 64.9 kg (143 lb)   10/26/23 0818 64.9 kg (143 lb)       Physical Exam  GENERAL: AFA/developed in no acute distress.   SKIN: Turgor ok without wound, rash, lesion of significance  HEENT: Normal cephalic without trauma.  Pupils equal round reactive to light. Extraocular motions full without nystagmus.     No thyromegaly, mass, tenderness, lymphadenopathy and supple.  CV: Regular rhythm.  No murmur, gallop,  edema. Posterior pulses intact.  No carotid bruits.  CHEST: No chest wall tenderness or mass.   LUNGS: Symmetric motion with clear to auscultation.   ABD: Soft, nontender without mass.   ORTHO: Symmetric extremities without swelling/point tenderness.  Full gross range of motion..  NEURO: CN 2-12 grossly intact.  Symmetric facies and UE/LE. 3/5 strength throughout. 1/4 x bicep knee equal reflexes.  Nonfocal use extremities. Speech clear.  Intact light touch with monofilament, vibratory sensation with tuning fork; equal toes/distal feet.  "   PSYCH: Oriented x 3.  Pleasant calm, well kept.  Purposeful/directed conservation with intact short/long gross memory.     Assessment & Plan     1. Chest pain, unspecified type    2. Atypical chest pain    3. Lung nodule    4. Heartburn        Data review above:   Discussions/medical decisions/reviews:  BP ok  Other vitals ok  Recent Vitals         10/26/2023 11/28/2023 3/28/2024       BP: 116/78 124/72 138/62     Pulse: 86 66 68     Temp: 98 °F (36.7 °C) 96.9 °F (36.1 °C) 98.6 °F (37 °C)     Weight: 64.9 kg (143 lb) 64.9 kg (143 lb) 68 kg (150 lb)     BMI (Calculated): 26.6 26.6 27.9           DM/BS 87 1.17.24  Lipid LDL sometime  PSA NA  CBC ok 1.17.24  Renal ok 1.17.24  Liver ok 1.17.24  Vit D 41 1.17.24  Thyroid TSH ok 1.17.24; none    Screening reviewed/updated some missing  Vaccines discussed (see below)   Stay on protonix   Has some risk for CAD just based on age/smoking; her decision to not test  Needs GI; previous for colon and now for EGD-this is planned  Still needs CT lung nodule-regroup on CT lung ([facility])    Follow up: No follow-ups on file.  Future Appointments   Date Time Provider Department Center   5/28/2024  9:45 AM Kostas Reed MD [facility]       Data review above:   Rx: reviewed and decisions:   Rx new/changes: none  No orders of the defined types were placed in this encounter.      Orders placed:   LAB/Testing/Referrals: reviewed/orders:   Today:   No orders of the defined types were placed in this encounter.    Chronic/recurrent labs above or change to:   Same       There is no immunization history on file for this patient.  We advised/reaffirmed our support/suggestion for staying complete with covid- covid boosters, seasonal flu/yearly and any missing vaccine from list we supplied; when cannot be given here we suggest contact with local health department office or pharmacy to review missing/needed vaccines and then bring nursing documentation for these vaccines to this office or  "call this information in. Shingles became \"free\" 1.1.23 for medicare insurance.    Health maintenance:   Body mass index is 27.89 kg/m².         Tobacco use reviewed:   Erica Rosas  reports that she has been smoking cigarettes. She started smoking about 39 years ago. She has a 39.2 pack-year smoking history. She has never used smokeless tobacco. I have educated her on the risk of diseases from using tobacco products such as cancer, COPD, and heart disease.     I advised her to quit and she is not willing to quit.    I spent 3  minutes counseling the patient.       Reminded; as discussed before.      There are no Patient Instructions on file for this visit.          "

## 2024-04-25 ENCOUNTER — TELEPHONE (OUTPATIENT)
Dept: FAMILY MEDICINE CLINIC | Facility: CLINIC | Age: 54
End: 2024-04-25

## 2024-04-25 NOTE — TELEPHONE ENCOUNTER
Caller: Erica Rosas    Relationship: Self    Best call back number: [phone]     Who are you requesting to speak with (clinical staff, provider,  specific staff member): CLINICAL STAFF    What was the call regarding: PATIENT REQUESTING A CALLBACK REGARDING HER TEST RESULTS.

## 2024-04-26 NOTE — TELEPHONE ENCOUNTER
Hub staff attempted to follow warm transfer process and was unsuccessful     Caller: Erica Rosas    Relationship to patient: Self    Best call back number: [phone]    Patient is needing: PATIENT IS CALLING ABOUT TEST RESULTS.

## 2024-05-28 ENCOUNTER — OFFICE VISIT (OUTPATIENT)
Dept: FAMILY MEDICINE CLINIC | Facility: CLINIC | Age: 54
End: 2024-05-28
Payer: COMMERCIAL

## 2024-05-28 VITALS
WEIGHT: 153 LBS | OXYGEN SATURATION: 98 % | DIASTOLIC BLOOD PRESSURE: 64 MMHG | SYSTOLIC BLOOD PRESSURE: 110 MMHG | TEMPERATURE: 97.3 F | HEIGHT: 62 IN | HEART RATE: 55 BPM | BODY MASS INDEX: 28.16 KG/M2

## 2024-05-28 DIAGNOSIS — K80.20 CALCULUS OF GALLBLADDER WITHOUT CHOLECYSTITIS WITHOUT OBSTRUCTION: ICD-10-CM

## 2024-05-28 DIAGNOSIS — R91.1 LUNG NODULE: ICD-10-CM

## 2024-05-28 DIAGNOSIS — R12 HEARTBURN: ICD-10-CM

## 2024-05-28 DIAGNOSIS — M25.562 RECURRENT PAIN OF LEFT KNEE: ICD-10-CM

## 2024-05-28 DIAGNOSIS — K76.0 FATTY LIVER: ICD-10-CM

## 2024-05-28 PROCEDURE — 99214 OFFICE O/P EST MOD 30 MIN: CPT | Performed by: FAMILY MEDICINE

## 2024-05-28 PROCEDURE — 1126F AMNT PAIN NOTED NONE PRSNT: CPT | Performed by: FAMILY MEDICINE

## 2024-05-28 NOTE — PROGRESS NOTES
KIARA”.   Subjective   Erica Rosas is a 54 y.o. female presenting with chief complaint of:   Chief Complaint   Patient presents with    Hospital Follow Up Visit     AWV NA  Last Completed Annual Wellness Visit       This patient has no relevant Health Maintenance data.             History of Present Illness :  Alone.  Here for primarily f/u [facility]  Went there for swelling/pain medial L knee.  No known acute injury.  2nd time in her life.      Has multiple chronic problems to consider that might have a bearing on today's issues; somewhat an interval appointment.       Chronic/acute problems reviewed today:   1. Recurrent pain of left knee - see above; no other joints involved   2. Heartburn: Chronic/stable.  Controlled heartburn, reflux without dysphagia, melena.  Rx used, periods not used proven currently needed with symptoms -currently doing ok.   She is afraid of NSAID      3 Cholelithiasis: seen while typing chart/patient gone that she had her CT chest and this was seen.  No mention today of pain.   Will reach out for US.    4 Fatty liver: seen on same CT chest   5 Lung nodules;  several/small repeat 12m and SQ/L flank area issue repeat 3m     Has an/another acute issue today: none.    The following portions of the patient's history were reviewed and updated as appropriate: allergies, current medications, past family history, past medical history, past social history, past surgical history, and problem list.      Current Outpatient Medications:     pantoprazole (PROTONIX) 40 MG EC tablet, Take 1 tablet by mouth Daily., Disp: 30 tablet, Rfl: 5    Vitamin D, Cholecalciferol, 25 MCG (1000 UT) capsule, Take 1 capsule by mouth 2 (Two) Times a Day., Disp: 60 capsule, Rfl: 5    No problems with medications.    No Known Allergies    Review of Systems  GENERAL:  Active/slower with limits, speed, stamina for age. Sleep is ok. No fever now./recent  SKIN: No rash/skin lesion of concern unless/other than that above if  mentioned  ENDO:  No syncope, near or diaphoretic sweaty spells.  BS Ok past..  HEENT: No recent head injury; or headache.   CHEST: No exertional chest wall tenderness or mass. No significant cough, (though occ) without wheeze.  No SOB; no hemoptysis.  CV: No exertional chest pain, palpitations, ankle edema.  GI: No dysphagia; recent heartburn.  No abdominal pain, diarrhea, constipation.  No rectal bleeding, or melena.    :  Voids without dysuria; no incontinence to completion.  ORTHO: No painful/swollen joints but various on /off sore-recent L medial knee.  No sore neck or back.  No acute neck or back pain without recent injury.  NEURO: No focal/significant weakness of extremities. No dizziness.   No numbness/paresthesias.   PSYCH: No memory loss.  Mood good/occ anxious, depressed but/and not suicidal.  Tries to tolerate stress .   Screening:  Gyne: years ago  Mammogram: 12.11.23  Bone density: never  Low dose CT chest: Tobacco-smoker/age 15/1 ppd  [facility]/imaging/ro/4.16.24  Several small nodules  Mild emphysema  1.1 cm soft tissue density SQ/L flank CT chest 3m  Cholelithiasis ? Pericholecystic edema; US recommended  Fatty liver  GI: none; advised later  Prostate: NA  Usual lab order to establish    Copy/paste function used for ROS/exam AND each area of these were reviewed, updated, confirmed and supplemented as needed.  Data reviewed:   Recent admit/ER/MD visits: 3.28.24    1. Chest pain, unspecified type    2. Atypical chest pain    3. Lung nodule    4. Heartburn          Data review above:   Discussions/medical decisions/reviews:  BP ok  Other vitals ok  Recent Vitals           10/26/2023 11/28/2023 3/28/2024          BP: 116/78 124/72 138/62       Pulse: 86 66 68       Temp: 98 °F (36.7 °C) 96.9 °F (36.1 °C) 98.6 °F (37 °C)       Weight: 64.9 kg (143 lb) 64.9 kg (143 lb) 68 kg (150 lb)       BMI (Calculated): 26.6 26.6 27.9               DM/BS 87 1.17.24  Lipid LDL sometime  PSA NA  CBC ok 1.17.24  Renal ok  1.17.24  Liver ok 1.17.24  Vit D 41 1.17.24  Thyroid TSH ok 1.17.24; none     Screening reviewed/updated some missing  Vaccines discussed (see below)   Stay on protonix   Has some risk for CAD just based on age/smoking; her decision to not test  Needs GI; previous for colon and now for EGD-this is planned  Still needs CT lung nodule-regroup on CT lung ([facility])    Last cardiac testing:   Echo:     Radiology considered:   No radiology results for the last 90 days.    [facility]/imaging/ED/5.14.24(5.16.24)  L knee; DJD patellofemoral  Per ED    Lab Results:    [facility]/lab/ED/3.14.24-same  CBC neg  Per ED    MM/lab/ED/3.14.24/same  CMP Cl 108, C02 21  Troponin neg  Per ED    Results for orders placed or performed in visit on 01/17/24   Comprehensive Metabolic Panel    Specimen: Blood   Result Value Ref Range    Glucose 87 65 - 99 mg/dL    BUN 15 6 - 20 mg/dL    Creatinine 0.84 0.57 - 1.00 mg/dL    EGFR Result 84.3 >60.0 mL/min/1.73    BUN/Creatinine Ratio 17.9 7.0 - 25.0    Sodium 142 136 - 145 mmol/L    Potassium 4.5 3.5 - 5.2 mmol/L    Chloride 103 98 - 107 mmol/L    Total CO2 24.9 22.0 - 29.0 mmol/L    Calcium 9.8 8.6 - 10.5 mg/dL    Total Protein 6.8 6.0 - 8.5 g/dL    Albumin 4.6 3.5 - 5.2 g/dL    Globulin 2.2 gm/dL    A/G Ratio 2.1 g/dL    Total Bilirubin 0.3 0.0 - 1.2 mg/dL    Alkaline Phosphatase 86 39 - 117 U/L    AST (SGOT) 17 1 - 32 U/L    ALT (SGPT) 14 1 - 33 U/L   TSH    Specimen: Blood   Result Value Ref Range    TSH 1.000 0.270 - 4.200 uIU/mL   Vitamin D,25-Hydroxy    Specimen: Blood   Result Value Ref Range    25 Hydroxy, Vitamin D 41.5 30.0 - 100.0 ng/ml   Hepatitis C Antibody    Specimen: Blood   Result Value Ref Range    Hep C Virus Ab Non Reactive Non Reactive   CBC & Differential    Specimen: Blood   Result Value Ref Range    WBC 6.42 3.40 - 10.80 10*3/mm3    RBC 4.58 3.77 - 5.28 10*6/mm3    Hemoglobin 12.9 12.0 - 15.9 g/dL    Hematocrit 40.1 34.0 - 46.6 %    MCV 87.6 79.0 - 97.0 fL    MCH 28.2 26.6 - 33.0 pg     "MCHC 32.2 31.5 - 35.7 g/dL    RDW 13.3 12.3 - 15.4 %    Platelets 299 140 - 450 10*3/mm3    Neutrophil Rel % 58.6 42.7 - 76.0 %    Lymphocyte Rel % 31.8 19.6 - 45.3 %    Monocyte Rel % 6.9 5.0 - 12.0 %    Eosinophil Rel % 1.9 0.3 - 6.2 %    Basophil Rel % 0.6 0.0 - 1.5 %    Neutrophils Absolute 3.77 1.70 - 7.00 10*3/mm3    Lymphocytes Absolute 2.04 0.70 - 3.10 10*3/mm3    Monocytes Absolute 0.44 0.10 - 0.90 10*3/mm3    Eosinophils Absolute 0.12 0.00 - 0.40 10*3/mm3    Basophils Absolute 0.04 0.00 - 0.20 10*3/mm3    Immature Granulocyte Rel % 0.2 0.0 - 0.5 %    Immature Grans Absolute 0.01 0.00 - 0.05 10*3/mm3    nRBC 0.0 0.0 - 0.2 /100 WBC       A1C:No results for input(s): \"HGBA1C\" in the last 13117 hours.  GLUCOSE:  Lab Results - Last 18 Months   Lab Units 01/17/24  0740 10/26/23  0758   GLUCOSE mg/dL 87 68*     LIPID:No results for input(s): \"CHLPL\", \"LDL\", \"HDL\", \"TRIG\" in the last 97927 hours.  PSA:No results found for: \"PSA\"    CBC:  Lab Results - Last 18 Months   Lab Units 01/17/24  0740 10/26/23  0758   WBC 10*3/mm3 6.42 5.7   HEMOGLOBIN g/dL 12.9 13.8   HEMATOCRIT % 40.1 42.4   PLATELETS 10*3/mm3 299 356     BMP/CMP:  Lab Results - Last 18 Months   Lab Units 01/17/24  0740 10/26/23  0758   SODIUM mmol/L 142 139   POTASSIUM mmol/L 4.5 4.4   CHLORIDE mmol/L 103 100   CO2 mmol/L 24.9 27   GLUCOSE mg/dL 87 68*   BUN mg/dL 15 8   CREATININE mg/dL 0.84 0.76   EGFR RESULT mL/min/1.73 84.3 95   CALCIUM mg/dL 9.8 9.3       HEPATIC:  Lab Results - Last 18 Months   Lab Units 01/17/24  0740 10/26/23  0758   ALT (SGPT) U/L 14 12   AST (SGOT) U/L 17 20   ALK PHOS U/L 86 87     HEPATITIS C ANTIBODY:   Lab Results   Component Value Date/Time    HEPCVIRUSABY Non Reactive 01/17/2024 07:40 AM    HEPCVIRUSABY Non Reactive 10/26/2023 07:58 AM     Vit D:  Lab Results - Last 18 Months   Lab Units 01/17/24  0740 10/26/23  0758   VIT D 25 HYDROXY ng/ml 41.5 12.9*     THYROID:  Lab Results - Last 18 Months   Lab Units 01/17/24  0740 " "10/26/23  0758   TSH uIU/mL 1.000 0.642       Objective   /64   Pulse 55   Temp 97.3 °F (36.3 °C)   Ht 156.2 cm (61.5\")   Wt 69.4 kg (153 lb)   SpO2 98%   BMI 28.44 kg/m²   Body mass index is 28.44 kg/m².    Recent Vitals         11/28/2023 3/28/2024 5/28/2024       BP: 124/72 138/62 110/64     Pulse: 66 68 55     Temp: 96.9 °F (36.1 °C) 98.6 °F (37 °C) 97.3 °F (36.3 °C)     Weight: 64.9 kg (143 lb) 68 kg (150 lb) 69.4 kg (153 lb)     BMI (Calculated): 26.6 27.9 28.4           Wt Readings from Last 15 Encounters:   05/28/24 1000 69.4 kg (153 lb)   03/28/24 1113 68 kg (150 lb)   11/28/23 0846 64.9 kg (143 lb)   10/26/23 0818 64.9 kg (143 lb)       Physical Exam  GENERAL: AFA/developed in no acute distress.   SKIN: Turgor ok without wound, rash, lesion of significance  HEENT: Normal cephalic without trauma.  Pupils equal round reactive to light. Extraocular motions full without nystagmus.   No thyromegaly, mass, tenderness, lymphadenopathy and supple.  CV: Regular rhythm.  No murmur, gallop,  edema. Posterior pulses intact.  No carotid bruits.  CHEST: No chest wall tenderness or mass.   LUNGS: Symmetric motion with clear to auscultation.   ABD: Soft, nontender without mass.   ORTHO: Symmetric extremities without swelling/point tenderness including L knee.  Full gross range of motion..  NEURO: CN 2-12 grossly intact.  Symmetric facies and UE/LE. 3/5 strength throughout. 1/4 x bicep knee equal reflexes.  Nonfocal use extremities. Speech clear.    PSYCH: Oriented x 3.  Pleasant calm, well kept.  Purposeful/directed conservation with intact short/long gross memory.     Assessment & Plan     1. Recurrent pain of left knee    2. Heartburn    3. Calculus of gallbladder without cholecystitis without obstruction    4. Fatty liver    5. Lung nodule        Data review above:   Discussions/medical decisions/reviews:  BP ok  Other vitals ok  Recent Vitals         11/28/2023 3/28/2024 5/28/2024       BP: 124/72 138/62 " "110/64     Pulse: 66 68 55     Temp: 96.9 °F (36.1 °C) 98.6 °F (37 °C) 97.3 °F (36.3 °C)     Weight: 64.9 kg (143 lb) 68 kg (150 lb) 69.4 kg (153 lb)     BMI (Calculated): 26.6 27.9 28.4           Screening reviewed/updated   Refer to ortho- ? MRI/medial cartliage issue  To repeat CT chest 3m (per [facility] radiology recommendation)  Patient to be called by staff  A. Not aware of her CT chest showing so much (was reviewed by anthony)  B. She has gallstones; this could cause/be causing pain RUQ; needs US gb/liver  C. She has fatty liver; very common/Anthony will discuss 7.2024 apt  D. Thing others have seen (ie [place]); was seen CT chest and is not in lung.  3m repeat CT of this area not unreasonable      Follow up: Return for as planned.  Future Appointments   Date Time Provider Department Center   7/23/2024  8:00 AM Anthony Sánchez APRN [facility]       Data review above:   Rx: reviewed and decisions:   Rx new/changes: none  No orders of the defined types were placed in this encounter.      Orders placed:   LAB/Testing/Referrals: reviewed/orders:   Today:   Orders Placed This Encounter   Procedures    US Gallbladder    Ambulatory Referral to Orthopedic Surgery     Chronic/recurrent labs above or change to:   Same      There is no immunization history on file for this patient.  We advised/reaffirmed our support/suggestion for staying complete with covid- covid boosters, seasonal flu/yearly and any missing vaccine from list we supplied; when cannot be given here we suggest contact with local health department office or pharmacy to review missing/needed vaccines and then bring nursing documentation for these vaccines to this office or call this information in. Shingles became \"free\" 1.1.23 for medicare insurance.    Health maintenance:   Body mass index is 28.44 kg/m².     Tobacco use reviewed:   Erica Rosas  reports that she has been smoking cigarettes. She started smoking about 39 years ago. She has a 39.4 " pack-year smoking history. She has never used smokeless tobacco. I have educated her on the risk of diseases from using tobacco products such as cancer, COPD, and heart disease.     I advised her to quit and she is not willing to quit.    I spent 3  minutes counseling the patient.    Reminded; as discussed before.      Patient Instructions   NSAID Education/Counseling:  We discussed the risks and benefits of Non-steroidal anti-inflammatories (NSAIDs), which include GI, renal, and cardiovascular toxicity. We discussed the risk for stomach ulcers, and the need to stop the drug or add a PPI if GI symptoms develop. We discussed the increased incidence of hypertension and cardiovascnlar events in patients taking NSAIDs We discussed the role the drugs may play in worsening renal disease, if present.

## 2024-05-28 NOTE — PATIENT INSTRUCTIONS
NSAID Education/Counseling:  We discussed the risks and benefits of Non-steroidal anti-inflammatories (NSAIDs), which include GI, renal, and cardiovascular toxicity. We discussed the risk for stomach ulcers, and the need to stop the drug or add a PPI if GI symptoms develop. We discussed the increased incidence of hypertension and cardiovascnlar events in patients taking NSAIDs We discussed the role the drugs may play in worsening renal disease, if present.

## 2024-05-29 NOTE — PROGRESS NOTES
Patient was already notified of her CT results and was informed to schedule a fup to discuss, patient has an appt on 07/23 with Caden. CT fup was recommended as well, patient spoke with understanding

## 2024-06-21 ENCOUNTER — TELEPHONE (OUTPATIENT)
Dept: FAMILY MEDICINE CLINIC | Facility: CLINIC | Age: 54
End: 2024-06-21
Payer: COMMERCIAL

## 2024-07-23 ENCOUNTER — OFFICE VISIT (OUTPATIENT)
Dept: FAMILY MEDICINE CLINIC | Facility: CLINIC | Age: 54
End: 2024-07-23
Payer: COMMERCIAL

## 2024-07-23 VITALS
HEART RATE: 58 BPM | WEIGHT: 145.8 LBS | SYSTOLIC BLOOD PRESSURE: 138 MMHG | RESPIRATION RATE: 18 BRPM | HEIGHT: 62 IN | DIASTOLIC BLOOD PRESSURE: 68 MMHG | BODY MASS INDEX: 26.83 KG/M2 | TEMPERATURE: 97.4 F | OXYGEN SATURATION: 98 %

## 2024-07-23 DIAGNOSIS — R91.1 PULMONARY NODULE: Primary | ICD-10-CM

## 2024-07-23 DIAGNOSIS — Z00.00 WELLNESS EXAMINATION: ICD-10-CM

## 2024-07-23 DIAGNOSIS — E55.9 VITAMIN D DEFICIENCY: ICD-10-CM

## 2024-07-23 DIAGNOSIS — K76.0 FATTY LIVER: ICD-10-CM

## 2024-07-23 DIAGNOSIS — K21.9 GASTROESOPHAGEAL REFLUX DISEASE WITHOUT ESOPHAGITIS: ICD-10-CM

## 2024-07-23 PROCEDURE — 99214 OFFICE O/P EST MOD 30 MIN: CPT | Performed by: NURSE PRACTITIONER

## 2024-07-23 PROCEDURE — 1126F AMNT PAIN NOTED NONE PRSNT: CPT | Performed by: NURSE PRACTITIONER

## 2024-07-23 NOTE — PROGRESS NOTES
"Subjective   Chief Complaint:  Reevaluation of lung nodules-chronic illness visit    History of Present Illness:  This 54 y.o. female was seen in the office today.  Presents back today 3-month follow-up exam for multiple lung nodules.  Has a history of GERD-sees GI and is on Protonix.  No flareups reported, recently had EGD and colonoscopy.    Past Medical, Surgical, Social, and Family History:  No Known Allergies History reviewed. No pertinent past medical history.   Past Surgical History:   Procedure Laterality Date     SECTION        Social History     Socioeconomic History    Marital status: Single   Tobacco Use    Smoking status: Every Day     Current packs/day: 1.00     Average packs/day: 1 pack/day for 39.6 years (39.6 ttl pk-yrs)     Types: Cigarettes     Start date:      Passive exposure: Current    Smokeless tobacco: Never    Tobacco comments:     Patient reports cutting back a lot   Vaping Use    Vaping status: Some Days    Substances: Nicotine, THC, Flavoring    Devices: Disposable    Passive vaping exposure: Yes   Substance and Sexual Activity    Alcohol use: Never    Drug use: Yes     Types: Marijuana    Sexual activity: Defer    History reviewed. No pertinent family history.    Objective   Vital Signs  /68 (BP Location: Left arm, Patient Position: Sitting, Cuff Size: Large Adult)   Pulse 58   Temp 97.4 °F (36.3 °C) (Infrared)   Resp 18   Ht 156.2 cm (61.5\")   Wt 66.1 kg (145 lb 12.8 oz)   SpO2 98%   BMI 27.10 kg/m²    Physical Exam  Vitals reviewed.   Constitutional:       General: She is not in acute distress.     Appearance: Normal appearance.   Cardiovascular:      Rate and Rhythm: Normal rate and regular rhythm.   Pulmonary:      Effort: Pulmonary effort is normal.      Breath sounds: Normal breath sounds.       Assessment & Plan   Diagnoses and all orders for this visit:    1. Pulmonary nodule (Primary)  Comments:  Chronic/due for follow-up exam  Orders:  -     CT Chest " Without Contrast; Future    2. Vitamin D deficiency  Comments:  Chronic continue vitamin D-due for lab  Orders:  -     Vitamin D,25-Hydroxy    3. Fatty liver  Comments:  Chronic-follows GI due for lab    4. Wellness examination  -     CBC & Differential  -     Comprehensive Metabolic Panel  -     TSH  -     Lipid Panel    5. Gastroesophageal reflux disease without esophagitis  Comments:  Chronic/stable-continue Protonix    6. BMI 27.0-27.9,adult  Comments:  Yearly BMI teaching sheet    Plan:  Advised and educated plan of care.      Follow-up:  The patient will Return in about 6 months (around 1/23/2025) for follow-Up.    Records and Results Review:  I performed a routine review of patient's chart including medication list and allergy list.      BMI is >= 25 and <30. (Overweight) The following options were offered after discussion;: Information on healthy weight added to patient's after visit summary.      Electronically signed by JORDAN Malloy, 07/23/24, 8:20 AM CDT.

## 2024-07-24 LAB
25(OH)D3+25(OH)D2 SERPL-MCNC: 37.3 NG/ML (ref 30–100)
ALBUMIN SERPL-MCNC: 4.7 G/DL (ref 3.5–5.2)
ALBUMIN/GLOB SERPL: 2.1 G/DL
ALP SERPL-CCNC: 95 U/L (ref 39–117)
ALT SERPL-CCNC: 11 U/L (ref 1–33)
AST SERPL-CCNC: 16 U/L (ref 1–32)
BASOPHILS # BLD AUTO: 0.03 10*3/MM3 (ref 0–0.2)
BASOPHILS NFR BLD AUTO: 0.6 % (ref 0–1.5)
BILIRUB SERPL-MCNC: 0.3 MG/DL (ref 0–1.2)
BUN SERPL-MCNC: 10 MG/DL (ref 6–20)
BUN/CREAT SERPL: 11.6 (ref 7–25)
CALCIUM SERPL-MCNC: 9.3 MG/DL (ref 8.6–10.5)
CHLORIDE SERPL-SCNC: 102 MMOL/L (ref 98–107)
CHOLEST SERPL-MCNC: 165 MG/DL (ref 0–200)
CO2 SERPL-SCNC: 27 MMOL/L (ref 22–29)
CREAT SERPL-MCNC: 0.86 MG/DL (ref 0.57–1)
EGFRCR SERPLBLD CKD-EPI 2021: 81.4 ML/MIN/1.73
EOSINOPHIL # BLD AUTO: 0.21 10*3/MM3 (ref 0–0.4)
EOSINOPHIL NFR BLD AUTO: 4.1 % (ref 0.3–6.2)
ERYTHROCYTE [DISTWIDTH] IN BLOOD BY AUTOMATED COUNT: 13.1 % (ref 12.3–15.4)
GLOBULIN SER CALC-MCNC: 2.2 GM/DL
GLUCOSE SERPL-MCNC: 68 MG/DL (ref 65–99)
HCT VFR BLD AUTO: 46.4 % (ref 34–46.6)
HDLC SERPL-MCNC: 42 MG/DL (ref 40–60)
HGB BLD-MCNC: 15 G/DL (ref 12–15.9)
IMM GRANULOCYTES # BLD AUTO: 0.01 10*3/MM3 (ref 0–0.05)
IMM GRANULOCYTES NFR BLD AUTO: 0.2 % (ref 0–0.5)
LDLC SERPL CALC-MCNC: 105 MG/DL (ref 0–100)
LYMPHOCYTES # BLD AUTO: 1.62 10*3/MM3 (ref 0.7–3.1)
LYMPHOCYTES NFR BLD AUTO: 31.7 % (ref 19.6–45.3)
MCH RBC QN AUTO: 28.2 PG (ref 26.6–33)
MCHC RBC AUTO-ENTMCNC: 32.3 G/DL (ref 31.5–35.7)
MCV RBC AUTO: 87.4 FL (ref 79–97)
MONOCYTES # BLD AUTO: 0.55 10*3/MM3 (ref 0.1–0.9)
MONOCYTES NFR BLD AUTO: 10.8 % (ref 5–12)
NEUTROPHILS # BLD AUTO: 2.69 10*3/MM3 (ref 1.7–7)
NEUTROPHILS NFR BLD AUTO: 52.6 % (ref 42.7–76)
NRBC BLD AUTO-RTO: 0 /100 WBC (ref 0–0.2)
PLATELET # BLD AUTO: 293 10*3/MM3 (ref 140–450)
POTASSIUM SERPL-SCNC: 5.1 MMOL/L (ref 3.5–5.2)
PROT SERPL-MCNC: 6.9 G/DL (ref 6–8.5)
RBC # BLD AUTO: 5.31 10*6/MM3 (ref 3.77–5.28)
SODIUM SERPL-SCNC: 139 MMOL/L (ref 136–145)
TRIGL SERPL-MCNC: 95 MG/DL (ref 0–150)
TSH SERPL DL<=0.005 MIU/L-ACNC: 0.9 UIU/ML (ref 0.27–4.2)
VLDLC SERPL CALC-MCNC: 18 MG/DL (ref 5–40)
WBC # BLD AUTO: 5.11 10*3/MM3 (ref 3.4–10.8)

## 2024-07-24 NOTE — PROGRESS NOTES
Reviewed results - [employer] message sent.  If not seen in 3 days (3 day alert set), will send to pool to call the message.      Electronically signed by JORDAN Malloy, 07/24/24, 7:03 AM CDT.

## 2024-08-02 DIAGNOSIS — R91.1 PULMONARY NODULE: ICD-10-CM

## 2024-08-02 DIAGNOSIS — R91.1 LUNG NODULE: Primary | ICD-10-CM

## 2024-08-02 NOTE — PROGRESS NOTES
Reviewed results - [employer] message sent.  If not seen in 3 days (3 day alert set), will send to pool to call the message.      Electronically signed by JORDAN Malloy, 08/02/24, 10:20 AM CDT.

## 2024-10-08 ENCOUNTER — OFFICE VISIT (OUTPATIENT)
Dept: FAMILY MEDICINE CLINIC | Facility: CLINIC | Age: 54
End: 2024-10-08

## 2024-10-08 VITALS
HEART RATE: 53 BPM | HEIGHT: 62 IN | WEIGHT: 148.8 LBS | BODY MASS INDEX: 27.38 KG/M2 | TEMPERATURE: 97.4 F | DIASTOLIC BLOOD PRESSURE: 68 MMHG | RESPIRATION RATE: 16 BRPM | OXYGEN SATURATION: 98 % | SYSTOLIC BLOOD PRESSURE: 98 MMHG

## 2024-10-08 DIAGNOSIS — M54.50 CHRONIC BILATERAL LOW BACK PAIN WITHOUT SCIATICA: Primary | ICD-10-CM

## 2024-10-08 DIAGNOSIS — G89.29 CHRONIC BILATERAL LOW BACK PAIN WITHOUT SCIATICA: Primary | ICD-10-CM

## 2024-10-08 PROCEDURE — 99214 OFFICE O/P EST MOD 30 MIN: CPT | Performed by: NURSE PRACTITIONER

## 2024-10-08 RX ORDER — METHYLPREDNISOLONE 4 MG
TABLET, DOSE PACK ORAL
Qty: 1 EACH | Refills: 0 | Status: SHIPPED | OUTPATIENT
Start: 2024-10-08

## 2024-10-08 NOTE — PROGRESS NOTES
"Subjective   Chief Complaint:  Back pain    History of Present Illness  This is a 54-year-old female presenting today with low back pain.  She reports no sciatica currently but has it intermittently.  Reports pain on both sides.  She describes pain around the L1 area and then also around the L5-S1 area as well.  Reports pain is chronic and has been to the chiropractor in the past.  Reports last x-ray she had was at a chiropractor and another region and was told she had degenerative disc disease and arthritis in her back.    Past Medical, Surgical, Social, and Family History:  No Known Allergies History reviewed. No pertinent past medical history.   Past Surgical History:   Procedure Laterality Date     SECTION        Social History     Socioeconomic History    Marital status: Single   Tobacco Use    Smoking status: Every Day     Current packs/day: 1.00     Average packs/day: 1 pack/day for 39.8 years (39.8 ttl pk-yrs)     Types: Cigarettes     Start date:      Passive exposure: Current    Smokeless tobacco: Never    Tobacco comments:     Patient reports cutting back a lot   Vaping Use    Vaping status: Some Days    Substances: Nicotine, THC, Flavoring    Devices: Disposable    Passive vaping exposure: Yes   Substance and Sexual Activity    Alcohol use: Never    Drug use: Yes     Types: Marijuana    Sexual activity: Defer    History reviewed. No pertinent family history.    Objective   Vital Signs  BP 98/68 (BP Location: Left arm, Patient Position: Sitting, Cuff Size: Adult)   Pulse 53   Temp 97.4 °F (36.3 °C)   Resp 16   Ht 156.2 cm (61.5\")   Wt 67.5 kg (148 lb 12.8 oz)   SpO2 98%   BMI 27.66 kg/m²    Physical Exam  Vitals reviewed.   Constitutional:       General: She is not in acute distress.     Appearance: Normal appearance.   Neck:      Vascular: No carotid bruit.   Cardiovascular:      Rate and Rhythm: Normal rate and regular rhythm.      Pulses:           Dorsalis pedis pulses are 2+ on the " right side and 2+ on the left side.        Posterior tibial pulses are 2+ on the right side and 2+ on the left side.   Pulmonary:      Effort: Pulmonary effort is normal.      Breath sounds: Normal breath sounds.   Musculoskeletal:         General: Tenderness present.      Right lower leg: No edema.      Left lower leg: No edema.       Diagnoses and all orders for this visit:    1. Chronic bilateral low back pain without sciatica (Primary)  Comments:  Chronic, unstable-Meds, x-ray then PT  Orders:  -     Ambulatory Referral to Physical Therapy for Evaluation & Treatment  -     XR Spine Lumbar 4+ View; Future    Other orders  -     methylPREDNISolone (MEDROL) 4 MG dose pack; Take as directed on package instructions.  Dispense: 1 each; Refill: 0  -     tiZANidine (Zanaflex) 4 MG tablet; Take 1 tablet by mouth At Night As Needed for Muscle Spasms.  Dispense: 20 tablet; Refill: 0    Plan:  Advised and educated plan of care.      Follow-up:  The patient will Return for follow-up as needed pending results - we will call.    Records and Results Reviewed:  I reviewed current medications as given by patient and allergy list    BMI is >= 25 and <30. (Overweight) The following options were offered after discussion;: Information on healthy weight added to patient's after visit summary.    : [employer]- and Dragon Speech Recognition - No recording technology was used in the exam room during encounter.    Electronically signed by JORDAN Malloy, 10/08/24, 4:11 PM CDT.

## 2025-01-23 ENCOUNTER — OFFICE VISIT (OUTPATIENT)
Dept: FAMILY MEDICINE CLINIC | Facility: CLINIC | Age: 55
End: 2025-01-23
Payer: COMMERCIAL

## 2025-01-23 VITALS
SYSTOLIC BLOOD PRESSURE: 110 MMHG | HEIGHT: 62 IN | TEMPERATURE: 97.2 F | DIASTOLIC BLOOD PRESSURE: 70 MMHG | WEIGHT: 155 LBS | HEART RATE: 63 BPM | BODY MASS INDEX: 28.52 KG/M2 | OXYGEN SATURATION: 97 %

## 2025-01-23 DIAGNOSIS — R91.8 LUNG NODULES: Primary | ICD-10-CM

## 2025-01-23 DIAGNOSIS — Z12.31 ENCOUNTER FOR SCREENING MAMMOGRAM FOR MALIGNANT NEOPLASM OF BREAST: ICD-10-CM

## 2025-01-23 PROCEDURE — 1159F MED LIST DOCD IN RCRD: CPT | Performed by: NURSE PRACTITIONER

## 2025-01-23 PROCEDURE — 1160F RVW MEDS BY RX/DR IN RCRD: CPT | Performed by: NURSE PRACTITIONER

## 2025-01-23 PROCEDURE — 1126F AMNT PAIN NOTED NONE PRSNT: CPT | Performed by: NURSE PRACTITIONER

## 2025-01-23 PROCEDURE — 99213 OFFICE O/P EST LOW 20 MIN: CPT | Performed by: NURSE PRACTITIONER

## 2025-01-23 NOTE — PROGRESS NOTES
"Subjective   Chief Complaint:  Reevaluation of lung nodules    History of Present Illness  The patient is a 54-year-old female presenting today for a regular checkup. She is not due for any labs until 2025. She does have a history of lung nodules and is due for a rescan. She is also due for her mammogram.  She shared that she is currently getting dental work done and is going to be starting a new job soon at [employer].    Past Medical, Surgical, Social, and Family History:  Allergies   Allergen Reactions    Hydrocodone-Acetaminophen Nausea Only    History reviewed. No pertinent past medical history.   Past Surgical History:   Procedure Laterality Date     SECTION        Social History     Socioeconomic History    Marital status: Single   Tobacco Use    Smoking status: Every Day     Current packs/day: 1.00     Average packs/day: 1 pack/day for 40.1 years (40.1 ttl pk-yrs)     Types: Cigarettes     Start date:      Passive exposure: Current    Smokeless tobacco: Never    Tobacco comments:     Patient reports cutting back a lot   Vaping Use    Vaping status: Some Days    Substances: Nicotine, THC, Flavoring    Devices: Disposable    Passive vaping exposure: Yes   Substance and Sexual Activity    Alcohol use: Never    Drug use: Yes     Types: Marijuana    Sexual activity: Defer    History reviewed. No pertinent family history.    Objective   Vital Signs  /70   Pulse 63   Temp 97.2 °F (36.2 °C) (Infrared)   Ht 156.2 cm (61.5\")   Wt 70.3 kg (155 lb)   SpO2 97%   BMI 28.81 kg/m²    Physical Exam  Vitals reviewed.   Constitutional:       General: She is not in acute distress.     Appearance: Normal appearance.   Cardiovascular:      Rate and Rhythm: Normal rate and regular rhythm.   Pulmonary:      Effort: Pulmonary effort is normal.      Breath sounds: Normal breath sounds.       Assessment & Plan   Assessment & Plan  1. Lung nodules.  A CT diagnostic without contrast will be scheduled to monitor " the lung nodules.    2. Encounter for screening mammogram for malignant neoplasm of breast.  She has been referred to the local hospital for a mammogram.    Follow-up:  The patient will Return for follow-up as needed.    Records and Results Reviewed:  I reviewed current medications as given by patient and allergy list    BMI is >= 25 and <30. (Overweight) The following options were offered after discussion;: Information on healthy weight added to patient's after visit summary.    : [employer]- and Dragon Speech Recognition - No recording technology was used in the exam room during encounter.    Electronically signed by JORDAN Malloy, 01/23/25, 8:24 AM CST.

## 2025-02-05 ENCOUNTER — TELEPHONE (OUTPATIENT)
Dept: FAMILY MEDICINE CLINIC | Facility: CLINIC | Age: 55
End: 2025-02-05
Payer: COMMERCIAL

## 2025-02-25 DIAGNOSIS — R91.8 LUNG NODULES: ICD-10-CM

## 2025-02-25 NOTE — PROGRESS NOTES
Reviewed results - [employer] message sent.  If not seen in 3 days (3 day alert set), will send to pool to call the message.      Electronically signed by JORDAN Malloy, 02/25/25, 4:04 PM CST.